# Patient Record
Sex: FEMALE | Race: WHITE
[De-identification: names, ages, dates, MRNs, and addresses within clinical notes are randomized per-mention and may not be internally consistent; named-entity substitution may affect disease eponyms.]

---

## 2019-04-14 ENCOUNTER — HOSPITAL ENCOUNTER (OUTPATIENT)
Dept: HOSPITAL 62 - LC | Age: 34
Discharge: HOME | End: 2019-04-14
Attending: OBSTETRICS & GYNECOLOGY
Payer: MEDICARE

## 2019-04-14 DIAGNOSIS — Z3A.24: ICD-10-CM

## 2019-04-14 DIAGNOSIS — O9A.212: Primary | ICD-10-CM

## 2019-04-14 LAB
APPEARANCE UR: (no result)
APTT PPP: YELLOW S
BARBITURATES UR QL SCN: NEGATIVE
BILIRUB UR QL STRIP: NEGATIVE
GLUCOSE UR STRIP-MCNC: NEGATIVE MG/DL
KETONES UR STRIP-MCNC: (no result) MG/DL
METHADONE UR QL SCN: NEGATIVE
NITRITE UR QL STRIP: NEGATIVE
PCP UR QL SCN: NEGATIVE
PH UR STRIP: 6 [PH] (ref 5–9)
PROT UR STRIP-MCNC: NEGATIVE MG/DL
SP GR UR STRIP: 1
URINE AMPHETAMINES SCREEN: NEGATIVE
URINE BENZODIAZEPINES SCREEN: NEGATIVE
URINE COCAINE SCREEN: NEGATIVE
URINE MARIJUANA (THC) SCREEN: NEGATIVE
UROBILINOGEN UR-MCNC: NEGATIVE MG/DL (ref ?–2)

## 2019-04-14 PROCEDURE — 80307 DRUG TEST PRSMV CHEM ANLYZR: CPT

## 2019-04-14 PROCEDURE — 4A1HXCZ MONITORING OF PRODUCTS OF CONCEPTION, CARDIAC RATE, EXTERNAL APPROACH: ICD-10-PCS | Performed by: OBSTETRICS & GYNECOLOGY

## 2019-04-14 PROCEDURE — 81001 URINALYSIS AUTO W/SCOPE: CPT

## 2019-07-23 LAB
ADD MANUAL DIFF: NO
APPEARANCE UR: CLEAR
APTT PPP: (no result) S
BARBITURATES UR QL SCN: NEGATIVE
BASOPHILS # BLD AUTO: 0 10^3/UL (ref 0–0.2)
BASOPHILS NFR BLD AUTO: 0.1 % (ref 0–2)
BILIRUB UR QL STRIP: NEGATIVE
EOSINOPHIL # BLD AUTO: 0 10^3/UL (ref 0–0.6)
EOSINOPHIL NFR BLD AUTO: 0.2 % (ref 0–6)
ERYTHROCYTE [DISTWIDTH] IN BLOOD BY AUTOMATED COUNT: 13 % (ref 11.5–14)
GLUCOSE UR STRIP-MCNC: NEGATIVE MG/DL
HCT VFR BLD CALC: 36.1 % (ref 36–47)
HGB BLD-MCNC: 12.4 G/DL (ref 12–15.5)
KETONES UR STRIP-MCNC: NEGATIVE MG/DL
LYMPHOCYTES # BLD AUTO: 1.4 10^3/UL (ref 0.5–4.7)
LYMPHOCYTES NFR BLD AUTO: 14.7 % (ref 13–45)
MCH RBC QN AUTO: 33.1 PG (ref 27–33.4)
MCHC RBC AUTO-ENTMCNC: 34.4 G/DL (ref 32–36)
MCV RBC AUTO: 96 FL (ref 80–97)
METHADONE UR QL SCN: NEGATIVE
MONOCYTES # BLD AUTO: 0.4 10^3/UL (ref 0.1–1.4)
MONOCYTES NFR BLD AUTO: 4.3 % (ref 3–13)
NEUTROPHILS # BLD AUTO: 7.7 10^3/UL (ref 1.7–8.2)
NEUTS SEG NFR BLD AUTO: 80.7 % (ref 42–78)
NITRITE UR QL STRIP: NEGATIVE
PCP UR QL SCN: NEGATIVE
PH UR STRIP: 6 [PH] (ref 5–9)
PLATELET # BLD: 214 10^3/UL (ref 150–450)
PROT UR STRIP-MCNC: NEGATIVE MG/DL
RBC # BLD AUTO: 3.75 10^6/UL (ref 3.72–5.28)
SP GR UR STRIP: 1
TOTAL CELLS COUNTED % (AUTO): 100 %
URINE AMPHETAMINES SCREEN: NEGATIVE
URINE BENZODIAZEPINES SCREEN: NEGATIVE
URINE COCAINE SCREEN: NEGATIVE
URINE MARIJUANA (THC) SCREEN: NEGATIVE
UROBILINOGEN UR-MCNC: NEGATIVE MG/DL (ref ?–2)
WBC # BLD AUTO: 9.5 10^3/UL (ref 4–10.5)

## 2019-07-24 ENCOUNTER — HOSPITAL ENCOUNTER (INPATIENT)
Dept: HOSPITAL 62 - 2N | Age: 34
LOS: 2 days | Discharge: HOME | End: 2019-07-26
Attending: OBSTETRICS & GYNECOLOGY | Admitting: OBSTETRICS & GYNECOLOGY
Payer: MEDICARE

## 2019-07-24 DIAGNOSIS — D62: ICD-10-CM

## 2019-07-24 DIAGNOSIS — Z79.899: ICD-10-CM

## 2019-07-24 DIAGNOSIS — Z30.2: ICD-10-CM

## 2019-07-24 DIAGNOSIS — Q82.3: ICD-10-CM

## 2019-07-24 PROCEDURE — 86900 BLOOD TYPING SEROLOGIC ABO: CPT

## 2019-07-24 PROCEDURE — 85027 COMPLETE CBC AUTOMATED: CPT

## 2019-07-24 PROCEDURE — 81001 URINALYSIS AUTO W/SCOPE: CPT

## 2019-07-24 PROCEDURE — 88302 TISSUE EXAM BY PATHOLOGIST: CPT

## 2019-07-24 PROCEDURE — C1765 ADHESION BARRIER: HCPCS

## 2019-07-24 PROCEDURE — 36415 COLL VENOUS BLD VENIPUNCTURE: CPT

## 2019-07-24 PROCEDURE — 94799 UNLISTED PULMONARY SVC/PX: CPT

## 2019-07-24 PROCEDURE — 80307 DRUG TEST PRSMV CHEM ANLYZR: CPT

## 2019-07-24 PROCEDURE — 86850 RBC ANTIBODY SCREEN: CPT

## 2019-07-24 PROCEDURE — 0UB70ZZ EXCISION OF BILATERAL FALLOPIAN TUBES, OPEN APPROACH: ICD-10-PCS | Performed by: OBSTETRICS & GYNECOLOGY

## 2019-07-24 PROCEDURE — 85025 COMPLETE CBC W/AUTO DIFF WBC: CPT

## 2019-07-24 PROCEDURE — 86901 BLOOD TYPING SEROLOGIC RH(D): CPT

## 2019-07-24 RX ADMIN — Medication SCH CAP: at 13:01

## 2019-07-24 RX ADMIN — OXYCODONE AND ACETAMINOPHEN PRN TAB: 5; 325 TABLET ORAL at 22:47

## 2019-07-24 RX ADMIN — OXYCODONE AND ACETAMINOPHEN PRN TAB: 5; 325 TABLET ORAL at 13:01

## 2019-07-24 RX ADMIN — DOCUSATE SODIUM SCH MG: 100 CAPSULE, LIQUID FILLED ORAL at 18:34

## 2019-07-24 RX ADMIN — DOCUSATE SODIUM SCH MG: 100 CAPSULE, LIQUID FILLED ORAL at 13:13

## 2019-07-24 RX ADMIN — OXYCODONE AND ACETAMINOPHEN PRN TAB: 5; 325 TABLET ORAL at 18:34

## 2019-07-24 NOTE — PDOC DELIVERY SUMMARY
Delivery Summary





- Maternal


Hx : IV


Hx Para: I


Hx # Term Pregnancies: 1


Hx #  Pregnancies: 0


Hx Total # of Abortions (Sponateous & Elective): 2


Number of Living Children: 1


ARVIN: 19


Gestational Age: 39+1


Prenatal Risk Factors: Previous 


Ruptured Membranes: AROM


Time of Rupture: 08:23


Fluids: Clear


Fluid Description: normal, clear





- Delivery


Labor: Not In Labor


Presentation: Vertex


Fetal Heart Rate Monitoring: Done Pre-Operatively


Uterine Contraction Monitoring: External


Pattern Other: CAT I


Support Person Present: Yes


Location: OR


: Scheduled, Repeat


Placenta: Within Normal Limits


Placenta Description: normal


Number of Vessels (Cord): 3


Nuchal Cord: No


Delivery of Placenta Date: 19


Delivery of Placenta Time: 08:25


Estimated Blood Loss: 854


Delivery Quantitative Blood Loss (QBL): 854





- Medications


Type of Anesthesia:: Spinal





- Infant Assess and Care


  ** Baby 1 Male


Delivery of Infant Date: 19


Delivery of Infant Time: 08:23


Apgar at 1 minute: 9


Apgar at 5 minutes: 9


Preprinted Number On Band: [id number]


Infant Medical Record Number: [id number]


Skin to Skin: No


To Nursery At: 08:30


Mode of Transport: Bassinet


Infant Delivery Weight: 3,305


Infant Delivery Length: 20.5 in





- Delivery Personnel


NNP: DORA MOTLEY


Nursesilvina RN: LOBO WILSON RN: GRACE RAMIREZ


RN: LÁZARO METZGER


MD: AUTUMN AYON

## 2019-07-24 NOTE — BRIEF OPERATIVE NOTE
BRIEF OPERATIVE REPORT


DATE OF SURGERY: 19


TIME OF SURGERY: 08:00


PREOPERATIVE DIAGNOSIS: Incontinentia Pigmenti (X linked), Gestational 

Hypertension, History of  section, Undesires Fertility, , 

39+1ega, Undesired Fertility.


POSTOPERATIVE DIAGNOSIS: MOMO - delivered


SURGEON: AUTUMN AYON


FINDINGS: normal uterus, normal bilateral tubes and ovaries, male infant 

delivered at 0823, weight 7#5oz, Apgars 9/9.  IVF 1250ml, UOP 700ml.  

Approximately 2cm of fallopian tube removed from each fallopian tube for tubal 

ligation in [place] fashion.  Exofin Tape for skin closure.


COMPLICATIONS: 


None


ESTIMATED BLOOD LOSS: 854ml


TISSUE REMOVED OR ALTERED: placenta and cord (not sent), bilateral portions of 

fallopian tubes


TECHNICAL PROCEDURE: Repeat  Section with [place] BTL

## 2019-07-24 NOTE — OPERATIVE REPORT
Operative Report


DATE OF SURGERY: 19


PREOPERATIVE DIAGNOSIS: Incontinentia Pigmenti (X linked), Gestational Hyperten

mindi, History of  section, Undesires Fertility, , 39+1ega, 

Undesired Fertility.


POSTOPERATIVE DIAGNOSIS: MOMO - delivered


OPERATION: Repeat  Section with [place] BTL


SURGEON: AUTUMN AYON


ANESTHESIA: Spinal


TISSUE REMOVED OR ALTERED: placenta and cord (not sent), bilateral portions of 

fallopian tubes


COMPLICATIONS: 


None


ESTIMATED BLOOD LOSS: 854ml


INTRAOPERATIVE FINDINGS: normal uterus, normal bilateral tubes and ovaries, male

infant delivered at 0823, weight 7#5oz, Apgars 9/9.  IVF 1250ml, UOP 700ml.  

Approximately 2cm of fallopian tube removed from each fallopian tube for tubal 

ligation in [place] fashion.  Exofin Tape for skin closure.


PROCEDURE: 


Anesthesia provider: [Frank NUNEZ, Alessia Orlando CRNA]





Urine output: [700ml]





IV fluids: [1250ml]





Indications: [35yo  at 39+1ega presents for scheduled repeat  

section.  She declines TOLAC.  She is 100% sure that she has completed 

childbearing and desires Bilateral tubal sterilization.  The pregnancy was 

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  She has gestational 

hypertension versus White coat syndrome with normal 24 hour UTP and labs.  She 

desires repeat  section and bilateral tubal ligation.  THe risks, 

benefits, alternatives were reviewed and she desires to proceed with planned 

procedure.  ]





Procedure: The patient was taken to the operating room where spinal anesthesia 

was obtained and found to be adequate.  She was then prepped and draped in the 

normal sterile fashion and placed in the dorsal supine position with a leftward 

tilt.  A Pfannenstiel skin incision was then made and carried through to the 

underlying layers of the fascia with the scalpel.  The fascia was incised in the

midline and the incision extended laterally with the Wylie scissors.  The 

superior aspect of the fascial incision was then grasped with Kocher clamps 

elevated and the underlying rectus muscles dissected off [bluntly].  Attention 

was then turned to the inferior aspect of the fascial incision which in a 

similar fashion was grasped, tented up with Kocher clamps, and the rectus 

muscles dissected off [bluntly].  The rectus muscles were then  in the 

midline and the peritoneum at the amount identified and entered [bluntly].  The 

peritoneal incision was then extended superiorly and inferiorly with good 

visualization of the bladder.  The bladder blade was inserted and the 

vesicouterine peritoneum identified grasped with [place] pickups and entered 

sharply with the Metzenbaum scissors.  This incision was then extended laterally

 with the Metzenbaum scissors and a bladder flap created digitally.  The bladder

 blade was then reinserted and the lower uterine segment incised in a transverse

 fashion with the scalpel.  The uterine incision was then extended bluntly.  The

 bladder blade was removed and the infant's head was delivered from cephalic 

presentation atraumatically.  The nose and mouth were suctioned and the cord 

doubly clamped and cut.  And the infant was handed off to waiting pediatricians.





The placenta was then delivered spontaneously and the uterus exteriorized and 

cleared of all clots and debris.  The uterine incision was then repaired with 1-

0 Vicryl in a running locked fashion.  A second layer of the same suture was 

used to obtain hemostasis via imbrication of the initial layer.  The bladder 

flap was then repaired with 3-0 chromic in a running fashion.  The left 

fallopian tube was identified and followed out to the fimbriated end and then 

mid ampullary portion was grasped and a window was made in the mesosalpinx and 

the proximal end was suture ligated times two and the distal end was suture 

ligated times two and the intervening portion was excised.  This procedure was 

repeated on the patients right fallopian tube which then complete [place] tubal

 ligation.  The uterus was returned to the patient's abdomen and Interceed was 

placed overlying the uterine incision to prevent adhesions and surgicel was 

placed for hemostasis control.  The gutters were cleared of all clots and 

debris.  All operative sites were noted to be hemostatic.  The fascia was 

reapproximated with 0 Vicryl in a running fashion from each lateral edge to the 

midline.  The skin was closed with 3-0 Monocryl in a running subcuticular 

fashion with overlying Exofin tape for additional dressing as well as wound 

closure.  The patient tolerated the procedure well.  Sponge lap needle and 

instrument counts are correct times 2.  500mg of Zithromax and Clindamycin 900mg

 were given prior to skin incision.  The patient was taken to the recovery area 

awake and in stable condition.

## 2019-07-25 LAB
ERYTHROCYTE [DISTWIDTH] IN BLOOD BY AUTOMATED COUNT: 13.1 % (ref 11.5–14)
HCT VFR BLD CALC: 29.6 % (ref 36–47)
HGB BLD-MCNC: 10.2 G/DL (ref 12–15.5)
MCH RBC QN AUTO: 33 PG (ref 27–33.4)
MCHC RBC AUTO-ENTMCNC: 34.4 G/DL (ref 32–36)
MCV RBC AUTO: 96 FL (ref 80–97)
PLATELET # BLD: 197 10^3/UL (ref 150–450)
RBC # BLD AUTO: 3.08 10^6/UL (ref 3.72–5.28)
WBC # BLD AUTO: 18.7 10^3/UL (ref 4–10.5)

## 2019-07-25 RX ADMIN — OXYCODONE AND ACETAMINOPHEN PRN TAB: 5; 325 TABLET ORAL at 17:46

## 2019-07-25 RX ADMIN — DOCUSATE SODIUM SCH MG: 100 CAPSULE, LIQUID FILLED ORAL at 09:41

## 2019-07-25 RX ADMIN — DOCUSATE SODIUM SCH MG: 100 CAPSULE, LIQUID FILLED ORAL at 17:47

## 2019-07-25 RX ADMIN — Medication SCH CAP: at 09:41

## 2019-07-25 RX ADMIN — OXYCODONE AND ACETAMINOPHEN PRN TAB: 5; 325 TABLET ORAL at 11:32

## 2019-07-25 NOTE — PDOC PROGRESS REPORT
Subjective-OB


Progress Note for:: 19





Physical Exam (OB)


Vital Signs: 


                                        











Temp Pulse Resp BP Pulse Ox


 


 98.3 F   77   18   107/67   99 


 


 19 07:17  19 07:17  19 07:17  19 07:17  19 07:17








                                 Intake & Output











 19





 06:59 06:59 06:59


 


Intake Total  700 


 


Output Total  2500 


 


Balance  -1800 


 


Weight 72.121 kg  














- PIH/Pre-Eclampsia


DTR's: 1 +


Clonus: Negative


Headache: Absent


Epigastric Pain: No


Visual Changes: No





- 


Dressing Removed: Yes - dermabond


Incision: Well Approximated


Closure Type: Surgical Glue





- Lochia


Lochia Amount: Scant < 10 ml


Lochia Color: Rubra/Red





- Abdomen


Description: Soft


Hernia Present: No


Bowel Sounds: Normoactive


Flatus Presence: Absent


Stool: No


Fundal Description: Firm, Midline


Fundal Height: u/u - u/2





Objective-Diagnostic


Laboratory: 


                                        





                                 19 06:29 





                                        











  19





  06:29


 


WBC  18.7 H


 


RBC  3.08 L


 


Hgb  10.2 L D


 


Hct  29.6 L


 


MCV  96


 


MCH  33.0


 


MCHC  34.4


 


RDW  13.1


 


Plt Count  197

## 2019-07-26 VITALS — DIASTOLIC BLOOD PRESSURE: 77 MMHG | SYSTOLIC BLOOD PRESSURE: 119 MMHG

## 2019-07-26 LAB
ERYTHROCYTE [DISTWIDTH] IN BLOOD BY AUTOMATED COUNT: 13.3 % (ref 11.5–14)
HCT VFR BLD CALC: 27.7 % (ref 36–47)
HGB BLD-MCNC: 9.5 G/DL (ref 12–15.5)
MCH RBC QN AUTO: 33.1 PG (ref 27–33.4)
MCHC RBC AUTO-ENTMCNC: 34.3 G/DL (ref 32–36)
MCV RBC AUTO: 97 FL (ref 80–97)
PLATELET # BLD: 195 10^3/UL (ref 150–450)
RBC # BLD AUTO: 2.87 10^6/UL (ref 3.72–5.28)
WBC # BLD AUTO: 15.6 10^3/UL (ref 4–10.5)

## 2019-07-26 RX ADMIN — DOCUSATE SODIUM SCH MG: 100 CAPSULE, LIQUID FILLED ORAL at 09:29

## 2019-07-26 RX ADMIN — Medication SCH CAP: at 09:29

## 2019-07-26 RX ADMIN — OXYCODONE AND ACETAMINOPHEN PRN TAB: 5; 325 TABLET ORAL at 12:03

## 2019-07-26 NOTE — PDOC PROGRESS REPORT
Subjective-OB


Progress Note for:: 19


Subjective: 





Doing well, noc/o, passing gas, family at BS, has appt in office





Physical Exam (OB)


Vital Signs: 


                                        











Temp Pulse Resp BP Pulse Ox


 


 98.7 F   91   16   131/82 H  100 


 


 19 07:21  19 07:21  19 07:21  19 07:21  19 07:21








                                 Intake & Output











 19





 06:59 06:59 06:59


 


Intake Total 700 500 


 


Output Total 2500  


 


Balance -1800 500 














- PIH/Pre-Eclampsia


DTR's: 1 +


Clonus: Negative


Headache: Absent


Epigastric Pain: No


Visual Changes: No





- 


Dressing Removed: No


Incision: Well Approximated


Closure Type: Surgical Glue





- Lochia


Lochia Amount: Scant < 10 ml


Lochia Color: Rubra/Red





- Abdomen


Description: Soft


Hernia Present: No


Fundal Description: Firm, Midline


Fundal Height: u/u - u/2





Objective-Diagnostic


Laboratory: 


                                        





                                 19 06:15 





                                        











  19





  06:15


 


WBC  15.6 H


 


RBC  2.87 L


 


Hgb  9.5 L


 


Hct  27.7 L


 


MCV  97


 


MCH  33.1


 


MCHC  34.3


 


RDW  13.3


 


Plt Count  195














Assessment and Plan(PN)





- Assessment and Plan


(1) Acute blood loss anemia


Is this a current diagnosis for this admission?: Yes   





(2) Uterine atony


Is this a current diagnosis for this admission?: Yes   





(3) Postpartum hemorrhage, delivered


Is this a current diagnosis for this admission?: Yes   





(4) Incontinentia pigmenti


Is this a current diagnosis for this admission?: Yes   





(5) Encounter for sterilization


Is this a current diagnosis for this admission?: Yes   





(6) Status post repeat low transverse  section


Is this a current diagnosis for this admission?: Yes   





- Time Spent with Patient


Time with patient: Less than 15 minutes


Medications reviewed and adjusted accordingly: Yes





- Disposition


Anticipated Discharge: Home


Within: within 24 hours

## 2019-07-26 NOTE — PDOC DISCHARGE SUMMARY
Final Diagnosis


Discharge Date: 19





- Final Diagnosis


(1) Acute blood loss anemia


Is this a current diagnosis for this admission?: Yes   





(2) Uterine atony


Is this a current diagnosis for this admission?: Yes   





(3) Postpartum hemorrhage, delivered


Is this a current diagnosis for this admission?: Yes   





(4) Incontinentia pigmenti


Is this a current diagnosis for this admission?: Yes   





(5) Encounter for sterilization


Is this a current diagnosis for this admission?: Yes   





(6) Status post repeat low transverse  section


Is this a current diagnosis for this admission?: Yes   





Discharge Data





- Discharge Medication


Prescriptions: 


Oxycodone HCl/Acetaminophen [Percocet 5-325 mg Tablet] 1 tab PO Q4HP PRN #20 

tablet


 PRN Reason: 


Home Medications: 








Prenat 115/Iron Fum/Folic/Dss [Prenatal 19 Tablet] 1 tab PO DAILY 19 


Oxycodone HCl/Acetaminophen [Percocet 5-325 mg Tablet] 1 tab PO Q4HP PRN #20 

tablet 19 








Gestational Age: 39.1


Reason(s) for Admission: Ceasarean Section-Repeat, Tubal Ligation, Gestional 

Diabetes


Prenatal Procedures: NST, Ultrasound


Intrapartum Procedure(s): : Low Cervical, Transverse, Tubal Ligation





-  Data


  ** Baby 1 Male


Home with Mother: Yes


Complications: No





- Diagnosis Test


Laboratory: 


                                        











Temp Pulse Resp BP Pulse Ox


 


 98.7 F   91   16   131/82 H  100 


 


 19 07:21  19 07:21  19 07:21  19 07:21  19 07:21








                                        











  19





  10:41 10:53 06:29


 


RBC   3.75  3.08 L


 


Hgb   12.4  10.2 L D


 


Hct   36.1  29.6 L


 


Urine Opiates Screen  NEGATIVE  














  19





  06:15


 


RBC  2.87 L


 


Hgb  9.5 L


 


Hct  27.7 L


 


Urine Opiates Screen 














- Discharge information/Instructions


Discharge Activity: Balance Activity w/Rest, No Lifting Over 10 Pounds, No 

Lifting/Push/Pulling, Pelvic Rest


Discharge Diet: As Tolerated, Regular


Disposition: HOME, SELF-CARE


Follow up with: [facility]


in: 5, Days